# Patient Record
Sex: MALE | Race: WHITE | NOT HISPANIC OR LATINO | URBAN - METROPOLITAN AREA
[De-identification: names, ages, dates, MRNs, and addresses within clinical notes are randomized per-mention and may not be internally consistent; named-entity substitution may affect disease eponyms.]

---

## 2023-10-14 ENCOUNTER — INPATIENT (INPATIENT)
Facility: HOSPITAL | Age: 23
LOS: 0 days | Discharge: ANOTHER HOSPITAL | End: 2023-10-15
Attending: STUDENT IN AN ORGANIZED HEALTH CARE EDUCATION/TRAINING PROGRAM | Admitting: STUDENT IN AN ORGANIZED HEALTH CARE EDUCATION/TRAINING PROGRAM
Payer: COMMERCIAL

## 2023-10-14 VITALS
HEART RATE: 83 BPM | DIASTOLIC BLOOD PRESSURE: 82 MMHG | TEMPERATURE: 98 F | RESPIRATION RATE: 18 BRPM | OXYGEN SATURATION: 97 % | HEIGHT: 73 IN | WEIGHT: 220.02 LBS | SYSTOLIC BLOOD PRESSURE: 119 MMHG

## 2023-10-14 LAB
ALBUMIN SERPL ELPH-MCNC: 3.5 G/DL — SIGNIFICANT CHANGE UP (ref 3.3–5)
ALP SERPL-CCNC: 78 U/L — SIGNIFICANT CHANGE UP (ref 40–120)
ALT FLD-CCNC: 48 U/L — SIGNIFICANT CHANGE UP (ref 12–78)
ANION GAP SERPL CALC-SCNC: 7 MMOL/L — SIGNIFICANT CHANGE UP (ref 5–17)
APPEARANCE UR: CLEAR — SIGNIFICANT CHANGE UP
AST SERPL-CCNC: 144 U/L — HIGH (ref 15–37)
BASOPHILS # BLD AUTO: 0.02 K/UL — SIGNIFICANT CHANGE UP (ref 0–0.2)
BASOPHILS NFR BLD AUTO: 0.2 % — SIGNIFICANT CHANGE UP (ref 0–2)
BILIRUB SERPL-MCNC: 0.7 MG/DL — SIGNIFICANT CHANGE UP (ref 0.2–1.2)
BILIRUB UR-MCNC: NEGATIVE — SIGNIFICANT CHANGE UP
BUN SERPL-MCNC: 11 MG/DL — SIGNIFICANT CHANGE UP (ref 7–23)
CALCIUM SERPL-MCNC: 8.8 MG/DL — SIGNIFICANT CHANGE UP (ref 8.5–10.1)
CHLORIDE SERPL-SCNC: 106 MMOL/L — SIGNIFICANT CHANGE UP (ref 96–108)
CO2 SERPL-SCNC: 25 MMOL/L — SIGNIFICANT CHANGE UP (ref 22–31)
COLOR SPEC: YELLOW — SIGNIFICANT CHANGE UP
CREAT SERPL-MCNC: 0.93 MG/DL — SIGNIFICANT CHANGE UP (ref 0.5–1.3)
DIFF PNL FLD: ABNORMAL
EGFR: 117 ML/MIN/1.73M2 — SIGNIFICANT CHANGE UP
EOSINOPHIL # BLD AUTO: 0.1 K/UL — SIGNIFICANT CHANGE UP (ref 0–0.5)
EOSINOPHIL NFR BLD AUTO: 0.9 % — SIGNIFICANT CHANGE UP (ref 0–6)
EPI CELLS # UR: SIGNIFICANT CHANGE UP
GLUCOSE SERPL-MCNC: 94 MG/DL — SIGNIFICANT CHANGE UP (ref 70–99)
GLUCOSE UR QL: NEGATIVE MG/DL — SIGNIFICANT CHANGE UP
HCT VFR BLD CALC: 41.7 % — SIGNIFICANT CHANGE UP (ref 39–50)
HGB BLD-MCNC: 13.9 G/DL — SIGNIFICANT CHANGE UP (ref 13–17)
IMM GRANULOCYTES NFR BLD AUTO: 0.3 % — SIGNIFICANT CHANGE UP (ref 0–0.9)
KETONES UR-MCNC: ABNORMAL
LEUKOCYTE ESTERASE UR-ACNC: NEGATIVE — SIGNIFICANT CHANGE UP
LYMPHOCYTES # BLD AUTO: 1.67 K/UL — SIGNIFICANT CHANGE UP (ref 1–3.3)
LYMPHOCYTES # BLD AUTO: 15.1 % — SIGNIFICANT CHANGE UP (ref 13–44)
MCHC RBC-ENTMCNC: 27.5 PG — SIGNIFICANT CHANGE UP (ref 27–34)
MCHC RBC-ENTMCNC: 33.3 G/DL — SIGNIFICANT CHANGE UP (ref 32–36)
MCV RBC AUTO: 82.6 FL — SIGNIFICANT CHANGE UP (ref 80–100)
MONOCYTES # BLD AUTO: 0.74 K/UL — SIGNIFICANT CHANGE UP (ref 0–0.9)
MONOCYTES NFR BLD AUTO: 6.7 % — SIGNIFICANT CHANGE UP (ref 2–14)
NEUTROPHILS # BLD AUTO: 8.53 K/UL — HIGH (ref 1.8–7.4)
NEUTROPHILS NFR BLD AUTO: 76.8 % — SIGNIFICANT CHANGE UP (ref 43–77)
NITRITE UR-MCNC: NEGATIVE — SIGNIFICANT CHANGE UP
NRBC # BLD: 0 /100 WBCS — SIGNIFICANT CHANGE UP (ref 0–0)
PH UR: 6 — SIGNIFICANT CHANGE UP (ref 5–8)
PLATELET # BLD AUTO: 219 K/UL — SIGNIFICANT CHANGE UP (ref 150–400)
POTASSIUM SERPL-MCNC: 3.8 MMOL/L — SIGNIFICANT CHANGE UP (ref 3.5–5.3)
POTASSIUM SERPL-SCNC: 3.8 MMOL/L — SIGNIFICANT CHANGE UP (ref 3.5–5.3)
PROT SERPL-MCNC: 7.6 GM/DL — SIGNIFICANT CHANGE UP (ref 6–8.3)
PROT UR-MCNC: 30 MG/DL
RBC # BLD: 5.05 M/UL — SIGNIFICANT CHANGE UP (ref 4.2–5.8)
RBC # FLD: 12.5 % — SIGNIFICANT CHANGE UP (ref 10.3–14.5)
RBC CASTS # UR COMP ASSIST: SIGNIFICANT CHANGE UP /HPF (ref 0–4)
SODIUM SERPL-SCNC: 138 MMOL/L — SIGNIFICANT CHANGE UP (ref 135–145)
SP GR SPEC: 1.01 — SIGNIFICANT CHANGE UP (ref 1.01–1.02)
UROBILINOGEN FLD QL: 4 MG/DL
WBC # BLD: 11.09 K/UL — HIGH (ref 3.8–10.5)
WBC # FLD AUTO: 11.09 K/UL — HIGH (ref 3.8–10.5)
WBC UR QL: SIGNIFICANT CHANGE UP

## 2023-10-14 PROCEDURE — 93010 ELECTROCARDIOGRAM REPORT: CPT

## 2023-10-14 PROCEDURE — 99285 EMERGENCY DEPT VISIT HI MDM: CPT

## 2023-10-14 RX ORDER — ACETAMINOPHEN 500 MG
975 TABLET ORAL ONCE
Refills: 0 | Status: COMPLETED | OUTPATIENT
Start: 2023-10-14 | End: 2023-10-14

## 2023-10-14 RX ORDER — FAMOTIDINE 10 MG/ML
20 INJECTION INTRAVENOUS ONCE
Refills: 0 | Status: COMPLETED | OUTPATIENT
Start: 2023-10-14 | End: 2023-10-14

## 2023-10-14 RX ORDER — METOCLOPRAMIDE HCL 10 MG
10 TABLET ORAL ONCE
Refills: 0 | Status: COMPLETED | OUTPATIENT
Start: 2023-10-14 | End: 2023-10-14

## 2023-10-14 RX ORDER — SODIUM CHLORIDE 9 MG/ML
1000 INJECTION INTRAMUSCULAR; INTRAVENOUS; SUBCUTANEOUS ONCE
Refills: 0 | Status: COMPLETED | OUTPATIENT
Start: 2023-10-14 | End: 2023-10-14

## 2023-10-14 RX ADMIN — Medication 10 MILLIGRAM(S): at 23:12

## 2023-10-14 RX ADMIN — SODIUM CHLORIDE 1000 MILLILITER(S): 9 INJECTION INTRAMUSCULAR; INTRAVENOUS; SUBCUTANEOUS at 22:51

## 2023-10-14 RX ADMIN — FAMOTIDINE 20 MILLIGRAM(S): 10 INJECTION INTRAVENOUS at 23:12

## 2023-10-14 RX ADMIN — SODIUM CHLORIDE 1000 MILLILITER(S): 9 INJECTION INTRAMUSCULAR; INTRAVENOUS; SUBCUTANEOUS at 23:12

## 2023-10-14 RX ADMIN — Medication 975 MILLIGRAM(S): at 22:51

## 2023-10-14 NOTE — ED ADULT TRIAGE NOTE - CHIEF COMPLAINT QUOTE
No PMH  C/o fever, cough, chills,  general malaises for 5 days. Was seen at the [facility] for similar symptoms, tested negative for COVID and flu.  Noticed lower lip swelling for last 1 hr. Denies sob, throat closing. Speaks full complete sentences, unlabored breathing on RA. No PMH  C/o fever, cough, chills,  general malaises for 5 days. Was seen at the [facility] for similar symptoms, tested negative for COVID and flu.  Noticed lower lip swelling for last 1 hr. Denies sob, throat closing. Speaks full complete sentences, unlabored breathing on RA. 18G LAC placed by EMS.

## 2023-10-14 NOTE — ED PROVIDER NOTE - CARE PROVIDERS DIRECT ADDRESSES
[email or web address]

## 2023-10-14 NOTE — ED ADULT NURSE NOTE - CHIEF COMPLAINT QUOTE
No PMH  C/o fever, cough, chills,  general malaises for 5 days. Was seen at the [facility] for similar symptoms, tested negative for COVID and flu.  Noticed lower lip swelling for last 1 hr. Denies sob, throat closing. Speaks full complete sentences, unlabored breathing on RA. 18G LAC placed by EMS.

## 2023-10-14 NOTE — ED PROVIDER NOTE - CLINICAL SUMMARY MEDICAL DECISION MAKING FREE TEXT BOX
24 yo M with likely viral syndrome, also concerning for pna, ebs and hiv unlikely, infectious diarrhea, dehydration  -cbc, cmp, ua, cx, epv, hiv, rvp, CXR, ekg, iv, pepcid/reglan for n/v, tylenol, hydration bolus  -f/u results, reeval

## 2023-10-14 NOTE — ED ADULT NURSE REASSESSMENT NOTE - NS ED NURSE REASSESS COMMENT FT1
Report from BRIT Rubio. Patient is AAOx4, sitting comfortably in bed with no complaints at this time. Respirations equal and unlabored. No acute distress noted at this time.

## 2023-10-14 NOTE — ED PROVIDER NOTE - PHYSICAL EXAMINATION
Vitals: WNL  Gen: AAOx3, NAD, sitting comfortably in stretcher  Head: ncat, perrla, eomi b/l  ENT: patent airway without exudate/erytehma, no swelling of airway/posterior oropharynx, + slight L lower lip swelling without rash or lesion, cold sore noted on R lower lip  Neck: supple, no lymphadenopathy, no midline deviation  Heart: rrr, no m/r/g  Lungs: CTA b/l, no rales/ronchi/wheezes  Abd: soft, nontender, non-distended, no rebound or guarding  Ext: no clubbing/cyanosis/edema  Neuro: sensation and muscle strength intact b/l

## 2023-10-14 NOTE — ED PROVIDER NOTE - OBJECTIVE STATEMENT
22 yo M with 5 days of viral symptoms changing over time.  Pt. explains it started with fever, chills, malaise, progressed to sore throat, then progressed to intractable N/V/D over last two days, unable to tolerate PO intake.  Pt. went to [facility] last night, had CT scan and labs--flu/covid negative, slightly elevated WBC's, grossly negative CT scan (slight liver enlargement).  Pt. admits to cramps in R forearm, which he thinks are likely related to dehydration.  Lastly pt. noticed lower L lip swelling and some swelling of hands b/l, which is also unusual.  Abd pain is improved since yesterday--and minimal at this time.  No recent travel or suspicious food intake.  Pt. says these symptoms are not usual for him.  ROS: negative for cough, headache, chest pain, shortness of breath, rash, paresthesia, and weakness--all other systems reviewed are negative.   PMH: negative; Meds: Denies; SH: Denies smoking/drinking/drug use

## 2023-10-14 NOTE — ED ADULT NURSE NOTE - CINV DISCH TEACH PARTICIP
Detail Level: Zone
Plan: Patient will sign VASQUEZ to request most recent labs from [facility]\\nTreatment will depend on lab results
negative...
Patient

## 2023-10-14 NOTE — ED ADULT NURSE NOTE - NSFALLUNIVINTERV_ED_ALL_ED
Bed/Stretcher in lowest position, wheels locked, appropriate side rails in place/Call bell, personal items and telephone in reach/Instruct patient to call for assistance before getting out of bed/chair/stretcher/Non-slip footwear applied when patient is off stretcher/[place] to call system/Physically safe environment - no spills, clutter or unnecessary equipment/Purposeful proactive rounding/Room/bathroom lighting operational, light cord in reach

## 2023-10-14 NOTE — ED PROVIDER NOTE - CARE PLAN
Principal Discharge DX:	Viral syndrome   1 Principal Discharge DX:	Viral syndrome  Secondary Diagnosis:	Elevated troponin  Secondary Diagnosis:	Acute myocarditis

## 2023-10-14 NOTE — ED PROVIDER NOTE - CARE PROVIDER_API CALL
Dakota Mauricio  Internal Medicine  [address] [place] [id number]  Phone: [phone]  Fax: [phone]  Follow Up Time: Urgent

## 2023-10-14 NOTE — ED PROVIDER NOTE - PROGRESS NOTE DETAILS
Results reported to patient--grossly benign, labs unremarkable thus far resulted   Pt. reports feeling better after meds, ready to go home   pt. agrees to f/u with primary care outpt. asap  pt. understands to return to ED if symptoms worsen; will d/c with meds for symptoms callback received from lab--trop elevated >25,000  pt. denies chest pain/sob over the phone, states he feels well and will come back to ER now  pt. called back and asks to come back to ER immediately regarding results, he understands the importance of coming back to ER pt. returned back to ER, comfortable, stable, will place on monitor, give ASA, admit to tele

## 2023-10-14 NOTE — ED ADULT NURSE NOTE - OBJECTIVE STATEMENT
Pt is A&OX4, ambulatory. States having flu like symptoms since Monday. Complaining of cough, chills, general malaises, abdominal pain, diarrhea, and fever. States being seen at [facility] yesterday for similar symptoms. States having lip swelling and hand swelling since 9:30pm. Denies SOB and throat closing. No acute distress noted. no pmh

## 2023-10-15 ENCOUNTER — INPATIENT (INPATIENT)
Facility: HOSPITAL | Age: 23
LOS: 1 days | Discharge: HOME | DRG: 315 | End: 2023-10-17
Attending: INTERNAL MEDICINE | Admitting: INTERNAL MEDICINE
Payer: COMMERCIAL

## 2023-10-15 VITALS
DIASTOLIC BLOOD PRESSURE: 69 MMHG | RESPIRATION RATE: 17 BRPM | OXYGEN SATURATION: 95 % | HEART RATE: 88 BPM | WEIGHT: 220.02 LBS | HEIGHT: 72 IN | SYSTOLIC BLOOD PRESSURE: 111 MMHG | TEMPERATURE: 98 F

## 2023-10-15 VITALS
SYSTOLIC BLOOD PRESSURE: 123 MMHG | HEART RATE: 68 BPM | DIASTOLIC BLOOD PRESSURE: 81 MMHG | RESPIRATION RATE: 18 BRPM | OXYGEN SATURATION: 97 % | TEMPERATURE: 98 F

## 2023-10-15 DIAGNOSIS — R79.89 OTHER SPECIFIED ABNORMAL FINDINGS OF BLOOD CHEMISTRY: ICD-10-CM

## 2023-10-15 DIAGNOSIS — I51.4 MYOCARDITIS, UNSPECIFIED: ICD-10-CM

## 2023-10-15 DIAGNOSIS — B34.9 VIRAL INFECTION, UNSPECIFIED: ICD-10-CM

## 2023-10-15 LAB
A1C WITH ESTIMATED AVERAGE GLUCOSE RESULT: 5.2 % — SIGNIFICANT CHANGE UP (ref 4–5.6)
ALBUMIN SERPL ELPH-MCNC: 3.6 G/DL — SIGNIFICANT CHANGE UP (ref 3.3–5)
ALBUMIN SERPL ELPH-MCNC: 4.6 G/DL — SIGNIFICANT CHANGE UP (ref 3.3–5)
ALP SERPL-CCNC: 75 U/L — SIGNIFICANT CHANGE UP (ref 40–120)
ALP SERPL-CCNC: 82 U/L — SIGNIFICANT CHANGE UP (ref 40–120)
ALT FLD-CCNC: 35 U/L — SIGNIFICANT CHANGE UP (ref 10–45)
ALT FLD-CCNC: 46 U/L — SIGNIFICANT CHANGE UP (ref 12–78)
AMPHET UR-MCNC: NEGATIVE — SIGNIFICANT CHANGE UP
ANION GAP SERPL CALC-SCNC: 12 MMOL/L — SIGNIFICANT CHANGE UP (ref 5–17)
ANION GAP SERPL CALC-SCNC: 7 MMOL/L — SIGNIFICANT CHANGE UP (ref 5–17)
APTT BLD: 27.6 SEC — SIGNIFICANT CHANGE UP (ref 24.5–35.6)
APTT BLD: 27.7 SEC — SIGNIFICANT CHANGE UP (ref 24.5–35.6)
AST SERPL-CCNC: 69 U/L — HIGH (ref 10–40)
AST SERPL-CCNC: 96 U/L — HIGH (ref 15–37)
BARBITURATES UR SCN-MCNC: NEGATIVE — SIGNIFICANT CHANGE UP
BASE EXCESS BLDV CALC-SCNC: 3.2 MMOL/L — HIGH (ref -2–3)
BASOPHILS # BLD AUTO: 0.03 K/UL — SIGNIFICANT CHANGE UP (ref 0–0.2)
BASOPHILS NFR BLD AUTO: 0.5 % — SIGNIFICANT CHANGE UP (ref 0–2)
BENZODIAZ UR-MCNC: NEGATIVE — SIGNIFICANT CHANGE UP
BILIRUB SERPL-MCNC: 0.4 MG/DL — SIGNIFICANT CHANGE UP (ref 0.2–1.2)
BILIRUB SERPL-MCNC: 0.5 MG/DL — SIGNIFICANT CHANGE UP (ref 0.2–1.2)
BLD GP AB SCN SERPL QL: NEGATIVE — SIGNIFICANT CHANGE UP
BUN SERPL-MCNC: 11 MG/DL — SIGNIFICANT CHANGE UP (ref 7–23)
BUN SERPL-MCNC: 9 MG/DL — SIGNIFICANT CHANGE UP (ref 7–23)
CA-I SERPL-SCNC: 1.22 MMOL/L — SIGNIFICANT CHANGE UP (ref 1.15–1.33)
CALCIUM SERPL-MCNC: 8.6 MG/DL — SIGNIFICANT CHANGE UP (ref 8.5–10.1)
CALCIUM SERPL-MCNC: 9.9 MG/DL — SIGNIFICANT CHANGE UP (ref 8.4–10.5)
CHLORIDE BLDV-SCNC: 105 MMOL/L — SIGNIFICANT CHANGE UP (ref 96–108)
CHLORIDE SERPL-SCNC: 103 MMOL/L — SIGNIFICANT CHANGE UP (ref 96–108)
CHLORIDE SERPL-SCNC: 109 MMOL/L — HIGH (ref 96–108)
CHOLEST SERPL-MCNC: 178 MG/DL — SIGNIFICANT CHANGE UP
CK MB BLD-MCNC: 7.2 % — HIGH (ref 0–3.5)
CK MB CFR SERPL CALC: 39.8 NG/ML — HIGH (ref 0.5–3.6)
CK SERPL-CCNC: 550 U/L — HIGH (ref 26–308)
CO2 BLDV-SCNC: 30 MMOL/L — HIGH (ref 22–26)
CO2 SERPL-SCNC: 24 MMOL/L — SIGNIFICANT CHANGE UP (ref 22–31)
CO2 SERPL-SCNC: 25 MMOL/L — SIGNIFICANT CHANGE UP (ref 22–31)
COCAINE METAB.OTHER UR-MCNC: NEGATIVE — SIGNIFICANT CHANGE UP
CREAT SERPL-MCNC: 0.88 MG/DL — SIGNIFICANT CHANGE UP (ref 0.5–1.3)
CREAT SERPL-MCNC: 0.9 MG/DL — SIGNIFICANT CHANGE UP (ref 0.5–1.3)
CRP SERPL-MCNC: 101 MG/L — HIGH (ref 0–4)
CRP SERPL-MCNC: 93 MG/L — HIGH
D DIMER BLD IA.RAPID-MCNC: 6695 NG/ML DDU — HIGH
EGFR: 122 ML/MIN/1.73M2 — SIGNIFICANT CHANGE UP
EGFR: 93 ML/MIN/1.73M2 — SIGNIFICANT CHANGE UP
EOSINOPHIL # BLD AUTO: 0.12 K/UL — SIGNIFICANT CHANGE UP (ref 0–0.5)
EOSINOPHIL NFR BLD AUTO: 2 % — SIGNIFICANT CHANGE UP (ref 0–6)
ERYTHROCYTE [SEDIMENTATION RATE] IN BLOOD: 58 MM/HR — HIGH (ref 0–15)
ESTIMATED AVERAGE GLUCOSE: 103 MG/DL — SIGNIFICANT CHANGE UP (ref 68–114)
GAS PNL BLDV: 138 MMOL/L — SIGNIFICANT CHANGE UP (ref 136–145)
GAS PNL BLDV: SIGNIFICANT CHANGE UP
GLUCOSE BLDV-MCNC: 107 MG/DL — HIGH (ref 70–99)
GLUCOSE SERPL-MCNC: 101 MG/DL — HIGH (ref 70–99)
GLUCOSE SERPL-MCNC: 83 MG/DL — SIGNIFICANT CHANGE UP (ref 70–99)
HCO3 BLDV-SCNC: 28 MMOL/L — SIGNIFICANT CHANGE UP (ref 22–29)
HCT VFR BLD CALC: 40.6 % — SIGNIFICANT CHANGE UP (ref 39–50)
HCT VFR BLD CALC: 41.1 % — SIGNIFICANT CHANGE UP (ref 39–50)
HCT VFR BLDA CALC: 43 % — SIGNIFICANT CHANGE UP (ref 39–51)
HDLC SERPL-MCNC: 15 MG/DL — LOW
HGB BLD CALC-MCNC: 14.2 G/DL — SIGNIFICANT CHANGE UP (ref 12.6–17.4)
HGB BLD-MCNC: 13.4 G/DL — SIGNIFICANT CHANGE UP (ref 13–17)
HGB BLD-MCNC: 13.8 G/DL — SIGNIFICANT CHANGE UP (ref 13–17)
HIV 1+2 AB+HIV1 P24 AG SERPL QL IA: SIGNIFICANT CHANGE UP
IMM GRANULOCYTES NFR BLD AUTO: 0.3 % — SIGNIFICANT CHANGE UP (ref 0–0.9)
INR BLD: 1.13 RATIO — SIGNIFICANT CHANGE UP (ref 0.85–1.18)
INR BLD: 1.22 RATIO — HIGH (ref 0.85–1.18)
LACTATE BLDV-MCNC: 0.9 MMOL/L — SIGNIFICANT CHANGE UP (ref 0.5–2)
LIPID PNL WITH DIRECT LDL SERPL: 120 MG/DL — HIGH
LYMPHOCYTES # BLD AUTO: 1.53 K/UL — SIGNIFICANT CHANGE UP (ref 1–3.3)
LYMPHOCYTES # BLD AUTO: 25.2 % — SIGNIFICANT CHANGE UP (ref 13–44)
MCHC RBC-ENTMCNC: 27.5 PG — SIGNIFICANT CHANGE UP (ref 27–34)
MCHC RBC-ENTMCNC: 28 PG — SIGNIFICANT CHANGE UP (ref 27–34)
MCHC RBC-ENTMCNC: 33 G/DL — SIGNIFICANT CHANGE UP (ref 32–36)
MCHC RBC-ENTMCNC: 33.6 GM/DL — SIGNIFICANT CHANGE UP (ref 32–36)
MCV RBC AUTO: 83.4 FL — SIGNIFICANT CHANGE UP (ref 80–100)
MCV RBC AUTO: 83.5 FL — SIGNIFICANT CHANGE UP (ref 80–100)
METHADONE UR-MCNC: NEGATIVE — SIGNIFICANT CHANGE UP
MONOCYTES # BLD AUTO: 0.38 K/UL — SIGNIFICANT CHANGE UP (ref 0–0.9)
MONOCYTES NFR BLD AUTO: 6.3 % — SIGNIFICANT CHANGE UP (ref 2–14)
NEUTROPHILS # BLD AUTO: 4 K/UL — SIGNIFICANT CHANGE UP (ref 1.8–7.4)
NEUTROPHILS NFR BLD AUTO: 65.7 % — SIGNIFICANT CHANGE UP (ref 43–77)
NON HDL CHOLESTEROL: 162 MG/DL — HIGH
NRBC # BLD: 0 /100 WBCS — SIGNIFICANT CHANGE UP (ref 0–0)
NT-PROBNP SERPL-SCNC: 1022 PG/ML — HIGH (ref 0–300)
OPIATES UR-MCNC: NEGATIVE — SIGNIFICANT CHANGE UP
PCO2 BLDV: 45 MMHG — SIGNIFICANT CHANGE UP (ref 42–55)
PCP SPEC-MCNC: SIGNIFICANT CHANGE UP
PCP UR-MCNC: NEGATIVE — SIGNIFICANT CHANGE UP
PH BLDV: 7.41 — SIGNIFICANT CHANGE UP (ref 7.32–7.43)
PLATELET # BLD AUTO: 217 K/UL — SIGNIFICANT CHANGE UP (ref 150–400)
PLATELET # BLD AUTO: 258 K/UL — SIGNIFICANT CHANGE UP (ref 150–400)
PO2 BLDV: 31 MMHG — SIGNIFICANT CHANGE UP (ref 25–45)
POTASSIUM BLDV-SCNC: 4.5 MMOL/L — SIGNIFICANT CHANGE UP (ref 3.5–5.1)
POTASSIUM SERPL-MCNC: 3.8 MMOL/L — SIGNIFICANT CHANGE UP (ref 3.5–5.3)
POTASSIUM SERPL-MCNC: 3.9 MMOL/L — SIGNIFICANT CHANGE UP (ref 3.5–5.3)
POTASSIUM SERPL-SCNC: 3.8 MMOL/L — SIGNIFICANT CHANGE UP (ref 3.5–5.3)
POTASSIUM SERPL-SCNC: 3.9 MMOL/L — SIGNIFICANT CHANGE UP (ref 3.5–5.3)
PROT SERPL-MCNC: 7.7 GM/DL — SIGNIFICANT CHANGE UP (ref 6–8.3)
PROT SERPL-MCNC: 8.1 G/DL — SIGNIFICANT CHANGE UP (ref 6–8.3)
PROTHROM AB SERPL-ACNC: 13.3 SEC — HIGH (ref 9.5–13)
PROTHROM AB SERPL-ACNC: 13.5 SEC — HIGH (ref 9.5–13)
RAPID RVP RESULT: SIGNIFICANT CHANGE UP
RBC # BLD: 4.87 M/UL — SIGNIFICANT CHANGE UP (ref 4.2–5.8)
RBC # BLD: 4.92 M/UL — SIGNIFICANT CHANGE UP (ref 4.2–5.8)
RBC # FLD: 12.4 % — SIGNIFICANT CHANGE UP (ref 10.3–14.5)
RBC # FLD: 12.5 % — SIGNIFICANT CHANGE UP (ref 10.3–14.5)
RH IG SCN BLD-IMP: POSITIVE — SIGNIFICANT CHANGE UP
SAO2 % BLDV: 46.7 % — LOW (ref 67–88)
SARS-COV-2 RNA SPEC QL NAA+PROBE: SIGNIFICANT CHANGE UP
SODIUM SERPL-SCNC: 140 MMOL/L — SIGNIFICANT CHANGE UP (ref 135–145)
THC UR QL: NEGATIVE — SIGNIFICANT CHANGE UP
TRIGL SERPL-MCNC: 237 MG/DL — HIGH
TROPONIN I, HIGH SENSITIVITY RESULT: SIGNIFICANT CHANGE UP NG/L
TROPONIN T, HIGH SENSITIVITY RESULT: 2716 NG/L — HIGH (ref 0–51)
WBC # BLD: 6.07 K/UL — SIGNIFICANT CHANGE UP (ref 3.8–10.5)
WBC # BLD: 6.08 K/UL — SIGNIFICANT CHANGE UP (ref 3.8–10.5)
WBC # FLD AUTO: 6.07 K/UL — SIGNIFICANT CHANGE UP (ref 3.8–10.5)
WBC # FLD AUTO: 6.08 K/UL — SIGNIFICANT CHANGE UP (ref 3.8–10.5)

## 2023-10-15 PROCEDURE — 93010 ELECTROCARDIOGRAM REPORT: CPT

## 2023-10-15 PROCEDURE — 71045 X-RAY EXAM CHEST 1 VIEW: CPT | Mod: 26

## 2023-10-15 PROCEDURE — 99285 EMERGENCY DEPT VISIT HI MDM: CPT

## 2023-10-15 PROCEDURE — 93306 TTE W/DOPPLER COMPLETE: CPT | Mod: 26

## 2023-10-15 PROCEDURE — 99223 1ST HOSP IP/OBS HIGH 75: CPT

## 2023-10-15 PROCEDURE — 99223 1ST HOSP IP/OBS HIGH 75: CPT | Mod: 25

## 2023-10-15 RX ORDER — FAMOTIDINE 10 MG/ML
1 INJECTION INTRAVENOUS
Qty: 7 | Refills: 0
Start: 2023-10-15 | End: 2023-10-21

## 2023-10-15 RX ORDER — ASPIRIN/CALCIUM CARB/MAGNESIUM 324 MG
81 TABLET ORAL DAILY
Refills: 0 | Status: DISCONTINUED | OUTPATIENT
Start: 2023-10-15 | End: 2023-10-15

## 2023-10-15 RX ORDER — HEPARIN SODIUM 5000 [USP'U]/ML
4000 INJECTION INTRAVENOUS; SUBCUTANEOUS EVERY 6 HOURS
Refills: 0 | Status: DISCONTINUED | OUTPATIENT
Start: 2023-10-15 | End: 2023-10-15

## 2023-10-15 RX ORDER — METOCLOPRAMIDE HCL 10 MG
1 TABLET ORAL
Qty: 20 | Refills: 0
Start: 2023-10-15 | End: 2023-10-19

## 2023-10-15 RX ORDER — HEPARIN SODIUM 5000 [USP'U]/ML
8000 INJECTION INTRAVENOUS; SUBCUTANEOUS ONCE
Refills: 0 | Status: DISCONTINUED | OUTPATIENT
Start: 2023-10-15 | End: 2023-10-15

## 2023-10-15 RX ORDER — HEPARIN SODIUM 5000 [USP'U]/ML
INJECTION INTRAVENOUS; SUBCUTANEOUS
Qty: 25000 | Refills: 0 | Status: DISCONTINUED | OUTPATIENT
Start: 2023-10-15 | End: 2023-10-15

## 2023-10-15 RX ORDER — INFLUENZA VIRUS VACCINE 15; 15; 15; 15 UG/.5ML; UG/.5ML; UG/.5ML; UG/.5ML
0.5 SUSPENSION INTRAMUSCULAR ONCE
Refills: 0 | Status: DISCONTINUED | OUTPATIENT
Start: 2023-10-15 | End: 2023-10-15

## 2023-10-15 RX ORDER — LOPERAMIDE HCL 2 MG
1 TABLET ORAL
Qty: 20 | Refills: 0
Start: 2023-10-15 | End: 2023-10-19

## 2023-10-15 RX ORDER — LANOLIN ALCOHOL/MO/W.PET/CERES
3 CREAM (GRAM) TOPICAL AT BEDTIME
Refills: 0 | Status: DISCONTINUED | OUTPATIENT
Start: 2023-10-15 | End: 2023-10-15

## 2023-10-15 RX ORDER — HEPARIN SODIUM 5000 [USP'U]/ML
8000 INJECTION INTRAVENOUS; SUBCUTANEOUS EVERY 6 HOURS
Refills: 0 | Status: DISCONTINUED | OUTPATIENT
Start: 2023-10-15 | End: 2023-10-15

## 2023-10-15 RX ORDER — ACETAMINOPHEN 500 MG
650 TABLET ORAL EVERY 6 HOURS
Refills: 0 | Status: DISCONTINUED | OUTPATIENT
Start: 2023-10-15 | End: 2023-10-15

## 2023-10-15 RX ORDER — ONDANSETRON 8 MG/1
4 TABLET, FILM COATED ORAL EVERY 8 HOURS
Refills: 0 | Status: DISCONTINUED | OUTPATIENT
Start: 2023-10-15 | End: 2023-10-15

## 2023-10-15 RX ADMIN — Medication 81 MILLIGRAM(S): at 15:35

## 2023-10-15 NOTE — ED PROVIDER NOTE - OBJECTIVE STATEMENT
23-year-old male presents as a transfer from [place].  Patient reports that he has been having body aches and fever up to 102 beginning 6 days ago.  She says she is 3 to 4 days ago started having nausea and vomiting and presented to an emergency department where he was given IV hydration and Zofran and subsequently discharged.  He was called back and advised that his troponin was elevated. patient admitted and troponin uptrending, echo with preserved EF. transferred for cardiac MRI.

## 2023-10-15 NOTE — H&P ADULT - HISTORY OF PRESENT ILLNESS
23 year old male with no significant PMH presents to the ED with 5 days history of viral symptoms changing over time.  Endorses  it started with fever, chills, malaise, progressed to sore throat, over the past 2 days he has been having intractable N/V/D with inability  to tolerate PO intake.  Pt. went to [facility] last night, had CT scan and labs--flu/covid negative, slightly elevated WBC's, grossly negative CT scan (slight liver enlargement). Endorses last nigh after he left the hospital he started to experience edema of his lips and hands which prompted ED visit. Denies SOB, chest pain, palpitations, SOB, headache, dizziness. In the ED, labs remarkable for elevated troponin (>25,000). ECG- NSR.  Upon evaluation patient is AAOx4, NAD, endorses he is feeling well, denies any pain or discomfort.

## 2023-10-15 NOTE — ED PROVIDER NOTE - QRS
Diabetes is improving with lifestyle modifications.   Continue current treatment regimen.  Diabetes will be reassessed in 3 months.   96

## 2023-10-15 NOTE — H&P ADULT - NSHPPHYSICALEXAM_GEN_ALL_CORE
Constitutional:  well developed, well nourished, no acute distress .   Eyes:  normal conjunctiva.   Neck:  normal venous pressure, no carotid bruit.   Cardiac:  RRR, no murmur, no rub, no gallop,   Pulmonary:  clear breath sounds in all  fields, good air entry, no respiratory distress.   Abdomen:  +BS,  soft, non-tender, no masses/organomegaly  Musculoskeletal:  normal gait.   Extremities:  no cyanosis, no clubbing, no varicosities, Lower extremity edema 2 b/l.   Skin:  no rash, no skin lesions.   Neurological:  moves all extremities, no focal deficits, normal speech.   Psychiatric:  alert and oriented, normal memory.

## 2023-10-15 NOTE — CHART NOTE - NSCHARTNOTEFT_GEN_A_CORE
Patient is a 23 yom with no medical history admitted to [facility] for elevated troponin to 29K, admitted for further workup.   - EKG is NSR, no ischemia noted, patient currently without any chest pain, shortness of breath, and is hemodynamically stable at this time   - trend troponin q6h   - f/u Utox  - f/u EBV, CMV and blood cultures to r/o infectious causes   - discussed with cardiology - given elevated ESR, CRP and recent viral illness, myocarditis is higher on the differential  - As such patient would need to obtain cardiac MRI, and would need continuing inpatient care  - Discussed with transfer center - patient is being transferred to [facility] for further workup    Enrico Vivas MD  Division of Hospital Medicine  Available via MS Teams

## 2023-10-15 NOTE — H&P ADULT - PROBLEM SELECTOR PLAN 2
5 days history of viral syndrome associated with N/V/D & 1 episode fo edema of the lips & hands which resolved   - Zofran   - Supportive care

## 2023-10-15 NOTE — H&P ADULT - ASSESSMENT
The patient is a 23y Male was transferred to [facility] because of elevated troponin.    Elevated troponin:    Cardio eval  Cardiac MRI

## 2023-10-15 NOTE — H&P ADULT - PROBLEM SELECTOR PLAN 1
R/O Myocarditis 2/2 viral infection   Troponin >25,000  ECG- NSR- No ST changes   Asymptomatic  - Tele  - ASA  - Echo   - Trend troponin   - Cardio consult

## 2023-10-15 NOTE — ED PROVIDER NOTE - ATTENDING APP SHARED VISIT CONTRIBUTION OF CARE
23y m [employer]. Now comes to ed transfer from [facility]/. Pt had c/o flu like symptoms few days ago and was seen at [facility] and [place] home. Pt subsequently tx at [facility]/ last night dc and recalled when trop highly positive. NO cp/sob/palps/abd pain. Pt admitted to hosp and transferred for concerns for myocarditis and Cardiac MRI. PE WDWN male awake alert normocephalic atraumatic neck supple chest clear anterior & posterior cv no rubs, gallops or murmurs abd soft +bs no mass guarding rebound, Neruo gcs 15 speech fluent power 5/5 all extr.  Garrett Patel MD, Facep

## 2023-10-15 NOTE — ED ADULT NURSE NOTE - OBJECTIVE STATEMENT
24 y/o male no significant PMHx arrives to [facility] ED by [facility] from [place] with c/o abnormal lab results. EMS states patient seen at [facility] ED Friday for flu-like symptoms, left before trops resulted, called back to return for elevated trops, seen at [facility] yesterday and had normal echo cardiogram performed however trops 2500 and trending upward, transferred to [facility] for cardiac MRI r/o myocarditis. Patient states "I feel better right now  than I have all weekend". Patient is A&Ox4. Respirations spontaneous and unlabored. Denies SOB, dyspnea, cough, chest pain, palpitations. EKG done, placed on CM. No abdominal pain, soft NT/ND. No n/v since friday. Denies urinary symptoms. Denies fever/chills. No sick contacts. Skin is warm/dry and normal for race. Ambulates independently at baseline.

## 2023-10-15 NOTE — H&P ADULT - ASSESSMENT
23 year old male with no significant PMH presents to the ED with 5 days history of viral symptoms changing over time.  Endorses  it started with fever, chills, malaise, progressed to sore throat, over the past 2 days he has been having intractable N/V/D with inability  to tolerate PO intake.  Pt. went to [facility] last night, had CT scan and labs--flu/covid negative, slightly elevated WBC's, grossly negative CT scan (slight liver enlargement). Endorses last nigh after he left the hospital he started to experience edema of his lips and hands which prompted ED visit. Denies SOB, chest pain, palpitations, SOB, headache, dizziness. In the ED, labs remarkable for elevated troponin (>25,000). ECG- NSR.  Upon evaluation patient is AAOx4, NAD, endorses he is feeling well, denies any pain or discomfort.

## 2023-10-15 NOTE — ED ADULT NURSE NOTE - NSFALLUNIVINTERV_ED_ALL_ED
Bed/Stretcher in lowest position, wheels locked, appropriate side rails in place/Call bell, personal items and telephone in reach/Instruct patient to call for assistance before getting out of bed/chair/stretcher/Non-slip footwear applied when patient is off stretcher/[place] to call system/Physically safe environment - no spills, clutter or unnecessary equipment/Purposeful proactive rounding/Room/bathroom lighting operational, light cord in reach

## 2023-10-15 NOTE — H&P ADULT - HISTORY OF PRESENT ILLNESS
23-year-old male presents as a transfer from [place].  Patient reports that he has been having body aches and fever up to 102 beginning 6 days ago.  She says she is 3 to 4 days ago started having nausea and vomiting and presented to an emergency department where he was given IV hydration and Zofran and subsequently discharged.  He was called back and advised that his troponin was elevated. patient admitted and troponin uptrending, echo with preserved EF. transferred for cardiac MRI.   23-year-old male presents as a transfer from [place].  Patient reports that he has been having body aches and fever up to 102 beginning 6 days ago.  She says she is 3 to 4 days ago started having nausea and vomiting and presented to an emergency department where he was given IV hydration and Zofran and subsequently discharged.  He was called back and advised that his troponin was elevated. patient admitted and troponin up trending, echo with preserved EF. transferred for cardiac MRI.

## 2023-10-15 NOTE — H&P ADULT - NSHPLABSRESULTS_GEN_ALL_CORE
13.9   11.09 )-----------( 219      ( 14 Oct 2023 22:44 )             41.7     10-14    138  |  106  |  11  ----------------------------<  94  3.8   |  25  |  0.93    Ca    8.8      14 Oct 2023 22:44    TPro  7.6  /  Alb  3.5  /  TBili  0.7  /  DBili  x   /  AST  144<H>  /  ALT  48  /  AlkPhos  78  10-14      Urinalysis Basic - ( 14 Oct 2023 23:30 )    Color: Yellow / Appearance: Clear / S.015 / pH: x  Gluc: x / Ketone: Small  / Bili: Negative / Urobili: 4 mg/dL   Blood: x / Protein: 30 mg/dL / Nitrite: Negative   Leuk Esterase: Negative / RBC: 0-2 /HPF / WBC 0-2   Sq Epi: x / Non Sq Epi: x / Bacteria: x

## 2023-10-15 NOTE — CONSULT NOTE ADULT - SUBJECTIVE AND OBJECTIVE BOX
TALI CASTELAN  [id number]    Date of Consult:  10/15/23  CHIEF COMPLAINT:  malaise  HISTORY OF PRESENT ILLNESS:  23M with no pmhx presents with malaise pt states recent URI, with malaise for the past week, went to [facility] with dehydration 2 nights ago, given IVF and Zofran and felt better and was d/c from ER. now comes in with recurrent fatigue, malaise, foudn to have trop >47307, CK >500 with elevated CKMB, elevated ESR, CRP  pt is comfortable appearing, grossly euvolemic, ekg showing SR, no events on tele.     Allergies    No Known Allergies    Intolerances    	    MEDICATIONS:  aspirin  chewable 81 milliGRAM(s) Oral daily        acetaminophen     Tablet .. 650 milliGRAM(s) Oral every 6 hours PRN  melatonin 3 milliGRAM(s) Oral at bedtime PRN  ondansetron Injectable 4 milliGRAM(s) IV Push every 8 hours PRN    aluminum hydroxide/magnesium hydroxide/simethicone Suspension 30 milliLiter(s) Oral every 4 hours PRN      influenza   Vaccine 0.5 milliLiter(s) IntraMuscular once      PAST MEDICAL & SURGICAL HISTORY:  No pertinent past medical history    no hx of myocarditis       FAMILY HISTORY:  no cardiac hx    SOCIAL HISTORY:    denies drug use, tob, etoh    REVIEW OF SYSTEMS:  See HPI. Otherwise, 10 point ROS done and otherwise negative.    PHYSICAL EXAM:  T(C): 36.6 (10-15-23 @ 11:09), Max: 37.1 (10-14-23 @ 23:15)  HR: 72 (10-15-23 @ 11:09) (68 - 83)  BP: 116/81 (10-15-23 @ 11:09) (109/75 - 135/69)  RR: 18 (10-15-23 @ 11:09) (13 - 18)  SpO2: 100% (10-15-23 @ 11:09) (97% - 100%)  Wt(kg): --  I&O's Summary      Appearance: Normal	  HEENT:   Normal oral mucosa, PERRL, EOMI	  Lymphatic: No lymphadenopathy  Cardiovascular: Normal S1 S2, No JVD, No murmurs, No edema  Respiratory: Lungs clear to auscultation	  Psychiatry: A & O x 3, Mood & affect appropriate  Gastrointestinal:  Soft, Non-tender, + BS	  Skin: No rashes, No ecchymoses, No cyanosis	  Neurologic: Non-focal  Extremities: Normal range of motion, No clubbing, cyanosis       LABS:	 	    CBC Full  -  ( 15 Oct 2023 10:55 )  WBC Count : 6.07 K/uL  Hemoglobin : 13.4 g/dL  Hematocrit : 40.6 %  Platelet Count - Automated : 217 K/uL  Mean Cell Volume : 83.4 fl  Mean Cell Hemoglobin : 27.5 pg  Mean Cell Hemoglobin Concentration : 33.0 g/dL  Auto Neutrophil # : x  Auto Lymphocyte # : x  Auto Monocyte # : x  Auto Eosinophil # : x  Auto Basophil # : x  Auto Neutrophil % : x  Auto Lymphocyte % : x  Auto Monocyte % : x  Auto Eosinophil % : x  Auto Basophil % : x    10-15    140  |  109<H>  |  11  ----------------------------<  83  3.8   |  24  |  0.90  10-14    138  |  106  |  11  ----------------------------<  94  3.8   |  25  |  0.93    Ca    8.6      15 Oct 2023 10:55  Ca    8.8      14 Oct 2023 22:44    TPro  7.7  /  Alb  3.6  /  TBili  0.5  /  DBili  x   /  AST  96<H>  /  ALT  46  /  AlkPhos  75  10-15  TPro  7.6  /  Alb  3.5  /  TBili  0.7  /  DBili  x   /  AST  144<H>  /  ALT  48  /  AlkPhos  78  10-14      proBNP:   Lipid Profile:   HgA1c:   TSH:       CARDIAC MARKERS:            TELEMETRY: 	    ECG:  	  RADIOLOGY:  OTHER: 	    PREVIOUS DIAGNOSTIC TESTING:    [ ] Echocardiogram:  [ ]  Catheterization:  [ ] Stress Test:  	  	  ASSESSMENT/PLAN: 	    Garrett Rodriguez MD

## 2023-10-15 NOTE — CONSULT NOTE ADULT - ASSESSMENT
23M with no pmhx presents with malaise in setting of recent URI found to have elevated CE.     given profound elevation in CE, ESR/CRP, myocarditis a concern  currently pt is asymptomatic and euvolemic, no cp or sob  ekg showing SR  would cont to monitor on tele for arrhythmias  monitor hemodynamics  d/w pt that if this is indeed myocarditis, there is a risk of decompensation  would not discharge pt.  would keep inpt to monitor  tte showing preserved Lv systolic function, would check cardiac MRI   cont to trend trop, ck, ckmb, pro-bnp, ESR CRP  pt adamantly denies drug use, would confirm with tox screen  will follow with you

## 2023-10-15 NOTE — ED PROVIDER NOTE - CLINICAL SUMMARY MEDICAL DECISION MAKING FREE TEXT BOX
Adult male pw c/o markedly elevated trop in setting of recent viral syndrome. Concerns for myocarditis. Plan tba medicine for MRI   Garrett Patel MD, Facep

## 2023-10-16 LAB
ANION GAP SERPL CALC-SCNC: 15 MMOL/L — SIGNIFICANT CHANGE UP (ref 5–17)
BUN SERPL-MCNC: 9 MG/DL — SIGNIFICANT CHANGE UP (ref 7–23)
CALCIUM SERPL-MCNC: 9.5 MG/DL — SIGNIFICANT CHANGE UP (ref 8.4–10.5)
CHLORIDE SERPL-SCNC: 103 MMOL/L — SIGNIFICANT CHANGE UP (ref 96–108)
CK MB BLD-MCNC: 4.4 % — HIGH (ref 0–3.5)
CK MB BLD-MCNC: 5 % — HIGH (ref 0–3.5)
CK MB CFR SERPL CALC: 10.9 NG/ML — HIGH (ref 0–6.7)
CK MB CFR SERPL CALC: 6.7 NG/ML — SIGNIFICANT CHANGE UP (ref 0–6.7)
CK SERPL-CCNC: 153 U/L — SIGNIFICANT CHANGE UP (ref 30–200)
CK SERPL-CCNC: 218 U/L — HIGH (ref 30–200)
CMV DNA CSF QL NAA+PROBE: SIGNIFICANT CHANGE UP IU/ML
CMV DNA SPEC NAA+PROBE-LOG#: SIGNIFICANT CHANGE UP LOG10IU/ML
CO2 SERPL-SCNC: 22 MMOL/L — SIGNIFICANT CHANGE UP (ref 22–31)
CREAT SERPL-MCNC: 1.07 MG/DL — SIGNIFICANT CHANGE UP (ref 0.5–1.3)
CULTURE RESULTS: SIGNIFICANT CHANGE UP
EBV EA AB SER IA-ACNC: <5 U/ML — SIGNIFICANT CHANGE UP
EBV EA AB SER IA-ACNC: <5 U/ML — SIGNIFICANT CHANGE UP
EBV EA AB TITR SER IF: POSITIVE
EBV EA AB TITR SER IF: POSITIVE
EBV EA IGG SER-ACNC: NEGATIVE — SIGNIFICANT CHANGE UP
EBV EA IGG SER-ACNC: NEGATIVE — SIGNIFICANT CHANGE UP
EBV NA IGG SER IA-ACNC: 119 U/ML — HIGH
EBV NA IGG SER IA-ACNC: 121 U/ML — HIGH
EBV PATRN SPEC IB-IMP: SIGNIFICANT CHANGE UP
EBV PATRN SPEC IB-IMP: SIGNIFICANT CHANGE UP
EBV VCA IGG AVIDITY SER QL IA: POSITIVE
EBV VCA IGG AVIDITY SER QL IA: POSITIVE
EBV VCA IGM SER IA-ACNC: 306 U/ML — HIGH
EBV VCA IGM SER IA-ACNC: 311 U/ML — HIGH
EBV VCA IGM SER IA-ACNC: 39.1 U/ML — HIGH
EBV VCA IGM SER IA-ACNC: 39.3 U/ML — HIGH
EBV VCA IGM TITR FLD: ABNORMAL
EBV VCA IGM TITR FLD: ABNORMAL
EGFR: 99 ML/MIN/1.73M2 — SIGNIFICANT CHANGE UP
ERYTHROCYTE [SEDIMENTATION RATE] IN BLOOD: 20 MM/HR — HIGH (ref 0–15)
GLUCOSE SERPL-MCNC: 88 MG/DL — SIGNIFICANT CHANGE UP (ref 70–99)
HCT VFR BLD CALC: 39.3 % — SIGNIFICANT CHANGE UP (ref 39–50)
HGB BLD-MCNC: 13.1 G/DL — SIGNIFICANT CHANGE UP (ref 13–17)
MCHC RBC-ENTMCNC: 28.1 PG — SIGNIFICANT CHANGE UP (ref 27–34)
MCHC RBC-ENTMCNC: 33.3 GM/DL — SIGNIFICANT CHANGE UP (ref 32–36)
MCV RBC AUTO: 84.3 FL — SIGNIFICANT CHANGE UP (ref 80–100)
NRBC # BLD: 0 /100 WBCS — SIGNIFICANT CHANGE UP (ref 0–0)
PLATELET # BLD AUTO: 248 K/UL — SIGNIFICANT CHANGE UP (ref 150–400)
POTASSIUM SERPL-MCNC: 3.9 MMOL/L — SIGNIFICANT CHANGE UP (ref 3.5–5.3)
POTASSIUM SERPL-SCNC: 3.9 MMOL/L — SIGNIFICANT CHANGE UP (ref 3.5–5.3)
RBC # BLD: 4.66 M/UL — SIGNIFICANT CHANGE UP (ref 4.2–5.8)
RBC # FLD: 12.5 % — SIGNIFICANT CHANGE UP (ref 10.3–14.5)
SODIUM SERPL-SCNC: 140 MMOL/L — SIGNIFICANT CHANGE UP (ref 135–145)
SPECIMEN SOURCE: SIGNIFICANT CHANGE UP
TROPONIN T, HIGH SENSITIVITY RESULT: 1323 NG/L — HIGH (ref 0–51)
TROPONIN T, HIGH SENSITIVITY RESULT: 1323 NG/L — HIGH (ref 0–51)
TROPONIN T, HIGH SENSITIVITY RESULT: 2203 NG/L — HIGH (ref 0–51)
TROPONIN T, HIGH SENSITIVITY RESULT: 2974 NG/L — HIGH (ref 0–51)
WBC # BLD: 6.52 K/UL — SIGNIFICANT CHANGE UP (ref 3.8–10.5)
WBC # FLD AUTO: 6.52 K/UL — SIGNIFICANT CHANGE UP (ref 3.8–10.5)

## 2023-10-16 PROCEDURE — 99223 1ST HOSP IP/OBS HIGH 75: CPT

## 2023-10-16 RX ORDER — INFLUENZA VIRUS VACCINE 15; 15; 15; 15 UG/.5ML; UG/.5ML; UG/.5ML; UG/.5ML
0.5 SUSPENSION INTRAMUSCULAR ONCE
Refills: 0 | Status: DISCONTINUED | OUTPATIENT
Start: 2023-10-16 | End: 2023-10-17

## 2023-10-16 NOTE — CONSULT NOTE ADULT - ATTENDING COMMENTS
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 Troponin 2700 then 2900, earlier markedly elevated troponin-i at [facility]. Has been afebrile for 48 hours, has no chest pain, no dyspnea, no palpitations, vital signs stable, exam without findings, echo at referring hospital with normal LV function, no regional wall motion abnormality. Plan cardiac MRI.    To contact call Cardiology Fellow or Attending as listed on [email or web address] password: [email or web address].

## 2023-10-16 NOTE — CONSULT NOTE ADULT - SUBJECTIVE AND OBJECTIVE BOX
Cardiology Consult Note   [Please check [email or web address] password: [email or web address]" for cardiology service schedule and contact information]    HPI:  23-year-old male presents as a transfer from [place].  Patient reports that he has been having body aches and fever up to 102 beginning 6 days ago.  She says she is 3 to 4 days ago started having nausea and vomiting and presented to an emergency department where he was given IV hydration and Zofran and subsequently discharged.  He was called back and advised that his troponin was elevated. patient admitted and troponin up trending, echo with preserved EF. transferred for cardiac MRI.   (15 Oct 2023 19:01)      PAST MEDICAL & SURGICAL HISTORY:  No pertinent past medical history      FAMILY HISTORY:    SOCIAL HISTORY:  unchanged    MEDICATIONS:    REVIEW OF SYSTEMS:  CV: chest pain (-), palpitation (-), orthopnea (-), PND (-), edema (-)  PULM: SOB (-), cough (-), wheezing (-), hemoptysis (-).   CONST: fever (-), chills (-) or fatigue (-)  GI: abdominal distension (-), abdominal pain (-) , nausea/vomiting (-), hematemesis, (-), melena (-), hematochezia (-)  : dysuria (-), frequency (-), hematuria (-).   NEURO: numbness (-), weakness (-), dizziness (-)  SKIN: itching (-), rash (-)  HEENT:  visual changes (-); vertigo or throat pain (-);  neck stiffness (-)     All other review of systems is negative unless indicated above.   -------------------------------------------------------------------------------------------  PHYSICAL EXAM:  T(C): 36.9 (10-16-23 @ 01:15), Max: 37 (10-15-23 @ 23:23)  HR: 79 (10-16-23 @ 01:15) (77 - 88)  BP: 121/74 (10-16-23 @ 01:15) (111/69 - 125/92)  RR: 17 (10-16-23 @ 01:15) (17 - 20)  SpO2: 99% (10-16-23 @ 01:15) (95% - 100%)  Wt(kg): --  I&O's Summary      GENERAL: NAD  HEAD: Atraumatic, Normocephalic.  EYES: EOMI, PERRLA, conjunctiva and sclera clear.  ENT: Moist mucous membranes.  NECK: Supple, No JVD.  CHEST/LUNG: Clear to auscultation bilaterally; No rales, rhonchi, wheezing, or rubs. Unlabored respirations.  HEART: Regular rate and rhythm; No murmurs, rubs, or gallops.  ABDOMEN: Bowel sounds present; Soft, Nontender, Nondistended.   EXTREMITIES:  2+ Peripheral Pulses, brisk capillary refill. No clubbing, cyanosis, or edema.  PSYCH: Normal affect.  SKIN: No rashes or lesions.    -------------------------------------------------------------------------------------------  LABS:                          13.8   6.08  )-----------( 258      ( 15 Oct 2023 18:21 )             41.1     10-15    140  |  103  |  9   ----------------------------<  101<H>  3.9   |  25  |  0.88    Ca    9.9      15 Oct 2023 18:21    TPro  8.1  /  Alb  4.6  /  TBili  0.4  /  DBili  x   /  AST  69<H>  /  ALT  35  /  AlkPhos  82  10-15    PT/INR - ( 15 Oct 2023 18:21 )   PT: 13.3 sec;   INR: 1.22 ratio         PTT - ( 15 Oct 2023 18:21 )  PTT:27.7 sec  CARDIAC MARKERS ( 16 Oct 2023 01:21 )  2974 ng/L / x     / x     / 218 U/L / x     / 10.9 ng/mL  CARDIAC MARKERS ( 15 Oct 2023 18:21 )  2716 ng/L / x     / x     / x     / x     / x                 Cardiology Consult Note   [Please check [email or web address] password: [email or web address]" for cardiology service schedule and contact information]    HPI:  23-year-old male presents as a transfer from [place].  Patient reports that he has been having body aches and fever up to 102 beginning 6 days ago.  She says she is 3 to 4 days ago started having nausea and vomiting and presented to an emergency department where he was given IV hydration and Zofran and subsequently discharged.  He was called back and advised that his troponin was elevated. patient admitted and troponin up trending, echo with preserved EF. transferred for cardiac MRI.   (15 Oct 2023 19:01)    patient states he had viral flu like symptoms beginning ~1 week ago  started with fatigue, weakness followed by fevers and chills  associated with cough, nausea, vomiting  went to ED on friday- admitted and treated with IVF and zofran, discharged to home next day  represented to ED on sunday with persistent symptoms- sent home after feeling better but troponin I resulted as >[id number] after leaving- patient called about result and asked to come back to ED  patient endorses MSK chest pain from coughing but no other chest discomfort symptoms  father has a hx of MI, no other family hx of cardiac disease    TTE from :  Summary:   1. Normal global left ventricular systolic function.   2. Left ventricular ejection fraction, by visual estimation, is 55 to 60%.   3. The left atrium is normal in size.   4. Structurally normal mitral valve, with normal leaflet excursion.   5. Trace mitral valve regurgitation.   6. Normal trileaflet aortic valve with normal opening.   7. The right atrium is normal in size.   8. Structurally normal tricuspid valve, with normal leaflet excursion.   9. Structurally normal pulmonic valve, with normal leaflet excursion.    PAST MEDICAL & SURGICAL HISTORY:  No pertinent past medical history      FAMILY HISTORY:    SOCIAL HISTORY:  unchanged    MEDICATIONS:    REVIEW OF SYSTEMS:  CV: chest pain (+), palpitation (-), orthopnea (-), PND (-), edema (-)  PULM: SOB (-), cough (-), wheezing (-), hemoptysis (-).   CONST: fever (-), chills (-) or fatigue (-)  GI: abdominal distension (-), abdominal pain (-) , nausea/vomiting (-), hematemesis, (-), melena (-), hematochezia (-)  : dysuria (-), frequency (-), hematuria (-).   NEURO: numbness (-), weakness (-), dizziness (-)  SKIN: itching (-), rash (-)  HEENT:  visual changes (-); vertigo or throat pain (-);  neck stiffness (-)     All other review of systems is negative unless indicated above.   -------------------------------------------------------------------------------------------  PHYSICAL EXAM:  T(C): 36.9 (10-16-23 @ 01:15), Max: 37 (10-15-23 @ 23:23)  HR: 79 (10-16-23 @ 01:15) (77 - 88)  BP: 121/74 (10-16-23 @ 01:15) (111/69 - 125/92)  RR: 17 (10-16-23 @ 01:15) (17 - 20)  SpO2: 99% (10-16-23 @ 01:15) (95% - 100%)  Wt(kg): --  I&O's Summary      GENERAL: NAD  HEAD: Atraumatic, Normocephalic.  EYES: EOMI, PERRLA, conjunctiva and sclera clear.  ENT: Moist mucous membranes.  NECK: Supple, No JVD.  CHEST/LUNG: Clear to auscultation bilaterally; No rales, rhonchi, wheezing, or rubs. Unlabored respirations.  HEART: Regular rate and rhythm; No murmurs, rubs, or gallops.  ABDOMEN: Bowel sounds present; Soft, Nontender, Nondistended.   EXTREMITIES:  2+ Peripheral Pulses, brisk capillary refill. No clubbing, cyanosis, or edema.  PSYCH: Normal affect.  SKIN: No rashes or lesions.    -------------------------------------------------------------------------------------------  LABS:                          13.8   6.08  )-----------( 258      ( 15 Oct 2023 18:21 )             41.1     10-15    140  |  103  |  9   ----------------------------<  101<H>  3.9   |  25  |  0.88    Ca    9.9      15 Oct 2023 18:21    TPro  8.1  /  Alb  4.6  /  TBili  0.4  /  DBili  x   /  AST  69<H>  /  ALT  35  /  AlkPhos  82  10-15    PT/INR - ( 15 Oct 2023 18:21 )   PT: 13.3 sec;   INR: 1.22 ratio         PTT - ( 15 Oct 2023 18:21 )  PTT:27.7 sec  CARDIAC MARKERS ( 16 Oct 2023 01:21 )  2974 ng/L / x     / x     / 218 U/L / x     / 10.9 ng/mL  CARDIAC MARKERS ( 15 Oct 2023 18:21 )  2716 ng/L / x     / x     / x     / x     / x

## 2023-10-16 NOTE — PROGRESS NOTE ADULT - SUBJECTIVE AND OBJECTIVE BOX
Patient is a 23y old  Male who presents with a chief complaint of The patient is a 23y Male was transferred to [facility] because of elevated troponin (16 Oct 2023 02:12)      SUBJECTIVE / OVERNIGHT EVENTS:    Events noted.  CONSTITUTIONAL: No fever,  or fatigue  RESPIRATORY: No cough, wheezing,  No shortness of breath  CARDIOVASCULAR: No chest pain, palpitations, dizziness, or leg swelling  GASTROINTESTINAL: No abdominal or epigastric pain.       MEDICATIONS  (STANDING):  influenza   Vaccine 0.5 milliLiter(s) IntraMuscular once    MEDICATIONS  (PRN):        CAPILLARY BLOOD GLUCOSE        I&O's Summary      T(C): 36.2 (10-16-23 @ 16:28), Max: 37 (10-15-23 @ 23:23)  HR: 77 (10-16-23 @ 16:28) (66 - 87)  BP: 122/73 (10-16-23 @ 16:28) (107/70 - 126/82)  RR: 18 (10-16-23 @ 16:28) (13 - 18)  SpO2: 96% (10-16-23 @ 16:28) (96% - 100%)    PHYSICAL EXAM:  GENERAL: NAD  NECK: Supple, No JVD  CHEST/LUNG: Clear to auscultation bilaterally; No wheezing.  HEART: Regular rate and rhythm; No murmurs, rubs, or gallops  ABDOMEN: Soft, Nontender, Nondistended; Bowel sounds present  EXTREMITIES:   No edema  NEUROLOGY: AAO X 3      LABS:                        13.1   6.52  )-----------( 248      ( 16 Oct 2023 06:34 )             39.3     10-16    140  |  103  |  9   ----------------------------<  88  3.9   |  22  |  1.07    Ca    9.5      16 Oct 2023 06:34    TPro  8.1  /  Alb  4.6  /  TBili  0.4  /  DBili  x   /  AST  69<H>  /  ALT  35  /  AlkPhos  82  10-15    PT/INR - ( 15 Oct 2023 18:21 )   PT: 13.3 sec;   INR: 1.22 ratio         PTT - ( 15 Oct 2023 18:21 )  PTT:27.7 sec  CARDIAC MARKERS ( 16 Oct 2023 08:46 )  x     / x     / 153 U/L / x     / 6.7 ng/mL  CARDIAC MARKERS ( 16 Oct 2023 01:21 )  x     / x     / 218 U/L / x     / 10.9 ng/mL      Urinalysis Basic - ( 16 Oct 2023 06:34 )    Color: x / Appearance: x / SG: x / pH: x  Gluc: 88 mg/dL / Ketone: x  / Bili: x / Urobili: x   Blood: x / Protein: x / Nitrite: x   Leuk Esterase: x / RBC: x / WBC x   Sq Epi: x / Non Sq Epi: x / Bacteria: x      CAPILLARY BLOOD GLUCOSE            RADIOLOGY & ADDITIONAL TESTS:    Imaging Personally Reviewed:    Consultant(s) Notes Reviewed:      Care Discussed with Consultants/Other Providers:    Arturo Wu MD, CMD, FACP    [address] [place] [address]  Office Tel: [phone]  Cell: [phone]

## 2023-10-16 NOTE — CONSULT NOTE ADULT - ASSESSMENT
The patient is a 23M with no significant pmhx transferred to [facility] for cMRI w/ concern for acute myocarditis, in the setting of recent viral illness    #Concern for acute myocarditis  Patient presented to OSH with viral symptoms- fevers, chills, fatigue, body aches  Discharged to home, but called to return to ED given elevated trops  Presented to [facility], transferred to [facility] for cMRI  [ ] Inflammatory markers: ESR 20,   [ ] Cardiac biomarkers: trop 2716 -> 2974, , CKMB 10.9, BNP 1022  [ ] Viral testing: COVID, RPP negative  [ ] No eosinophilia noted to peripheral CBC  [ ] EKG abnormal with no specific ischemic changes  - please repeat TTE, order cMRI  - patient may need ischemic eval to formally exclude CAD- may be good candidate for CCTA  - no urgent indication for EMB, but will consider pending cMRI results    Nayely Hargrove MD  PGY-4, Cardiology Fellow    Please check [email or web address] password: [email or web address]" for cardiology service schedule and contact information.   *Please note that all recommendations are incomplete until attending attestation*

## 2023-10-16 NOTE — PROGRESS NOTE ADULT - ASSESSMENT
The patient is a 23y Male was transferred to NS because of elevated troponin.    Elevated troponin:    Cardio eval appreciated  Cardiac MRI

## 2023-10-17 ENCOUNTER — TRANSCRIPTION ENCOUNTER (OUTPATIENT)
Age: 23
End: 2023-10-17

## 2023-10-17 VITALS
DIASTOLIC BLOOD PRESSURE: 67 MMHG | RESPIRATION RATE: 18 BRPM | SYSTOLIC BLOOD PRESSURE: 109 MMHG | OXYGEN SATURATION: 99 % | HEART RATE: 65 BPM | TEMPERATURE: 98 F

## 2023-10-17 LAB
ANION GAP SERPL CALC-SCNC: 11 MMOL/L — SIGNIFICANT CHANGE UP (ref 5–17)
ANION GAP SERPL CALC-SCNC: 11 MMOL/L — SIGNIFICANT CHANGE UP (ref 5–17)
BUN SERPL-MCNC: 11 MG/DL — SIGNIFICANT CHANGE UP (ref 7–23)
BUN SERPL-MCNC: 11 MG/DL — SIGNIFICANT CHANGE UP (ref 7–23)
CALCIUM SERPL-MCNC: 9.6 MG/DL — SIGNIFICANT CHANGE UP (ref 8.4–10.5)
CALCIUM SERPL-MCNC: 9.6 MG/DL — SIGNIFICANT CHANGE UP (ref 8.4–10.5)
CHLORIDE SERPL-SCNC: 103 MMOL/L — SIGNIFICANT CHANGE UP (ref 96–108)
CHLORIDE SERPL-SCNC: 103 MMOL/L — SIGNIFICANT CHANGE UP (ref 96–108)
CO2 SERPL-SCNC: 24 MMOL/L — SIGNIFICANT CHANGE UP (ref 22–31)
CO2 SERPL-SCNC: 24 MMOL/L — SIGNIFICANT CHANGE UP (ref 22–31)
CREAT SERPL-MCNC: 0.95 MG/DL — SIGNIFICANT CHANGE UP (ref 0.5–1.3)
CREAT SERPL-MCNC: 0.95 MG/DL — SIGNIFICANT CHANGE UP (ref 0.5–1.3)
EGFR: 114 ML/MIN/1.73M2 — SIGNIFICANT CHANGE UP
EGFR: 114 ML/MIN/1.73M2 — SIGNIFICANT CHANGE UP
GLUCOSE SERPL-MCNC: 92 MG/DL — SIGNIFICANT CHANGE UP (ref 70–99)
GLUCOSE SERPL-MCNC: 92 MG/DL — SIGNIFICANT CHANGE UP (ref 70–99)
POTASSIUM SERPL-MCNC: 4.2 MMOL/L — SIGNIFICANT CHANGE UP (ref 3.5–5.3)
POTASSIUM SERPL-MCNC: 4.2 MMOL/L — SIGNIFICANT CHANGE UP (ref 3.5–5.3)
POTASSIUM SERPL-SCNC: 4.2 MMOL/L — SIGNIFICANT CHANGE UP (ref 3.5–5.3)
POTASSIUM SERPL-SCNC: 4.2 MMOL/L — SIGNIFICANT CHANGE UP (ref 3.5–5.3)
SODIUM SERPL-SCNC: 138 MMOL/L — SIGNIFICANT CHANGE UP (ref 135–145)
SODIUM SERPL-SCNC: 138 MMOL/L — SIGNIFICANT CHANGE UP (ref 135–145)
TROPONIN T, HIGH SENSITIVITY RESULT: 546 NG/L — HIGH (ref 0–51)
TROPONIN T, HIGH SENSITIVITY RESULT: 546 NG/L — HIGH (ref 0–51)

## 2023-10-17 PROCEDURE — 75561 CARDIAC MRI FOR MORPH W/DYE: CPT | Mod: 26

## 2023-10-17 PROCEDURE — 85027 COMPLETE CBC AUTOMATED: CPT

## 2023-10-17 PROCEDURE — 36415 COLL VENOUS BLD VENIPUNCTURE: CPT

## 2023-10-17 PROCEDURE — A9585: CPT

## 2023-10-17 PROCEDURE — 82330 ASSAY OF CALCIUM: CPT

## 2023-10-17 PROCEDURE — 0225U NFCT DS DNA&RNA 21 SARSCOV2: CPT

## 2023-10-17 PROCEDURE — 84484 ASSAY OF TROPONIN QUANT: CPT

## 2023-10-17 PROCEDURE — 75561 CARDIAC MRI FOR MORPH W/DYE: CPT

## 2023-10-17 PROCEDURE — 82553 CREATINE MB FRACTION: CPT

## 2023-10-17 PROCEDURE — 85014 HEMATOCRIT: CPT

## 2023-10-17 PROCEDURE — 83605 ASSAY OF LACTIC ACID: CPT

## 2023-10-17 PROCEDURE — 86140 C-REACTIVE PROTEIN: CPT

## 2023-10-17 PROCEDURE — 85025 COMPLETE CBC W/AUTO DIFF WBC: CPT

## 2023-10-17 PROCEDURE — 86901 BLOOD TYPING SEROLOGIC RH(D): CPT

## 2023-10-17 PROCEDURE — 99285 EMERGENCY DEPT VISIT HI MDM: CPT

## 2023-10-17 PROCEDURE — 71045 X-RAY EXAM CHEST 1 VIEW: CPT

## 2023-10-17 PROCEDURE — 82803 BLOOD GASES ANY COMBINATION: CPT

## 2023-10-17 PROCEDURE — 99233 SBSQ HOSP IP/OBS HIGH 50: CPT | Mod: GC

## 2023-10-17 PROCEDURE — 85730 THROMBOPLASTIN TIME PARTIAL: CPT

## 2023-10-17 PROCEDURE — 86900 BLOOD TYPING SEROLOGIC ABO: CPT

## 2023-10-17 PROCEDURE — 85652 RBC SED RATE AUTOMATED: CPT

## 2023-10-17 PROCEDURE — 82947 ASSAY GLUCOSE BLOOD QUANT: CPT

## 2023-10-17 PROCEDURE — 85018 HEMOGLOBIN: CPT

## 2023-10-17 PROCEDURE — 83880 ASSAY OF NATRIURETIC PEPTIDE: CPT

## 2023-10-17 PROCEDURE — 82435 ASSAY OF BLOOD CHLORIDE: CPT

## 2023-10-17 PROCEDURE — 82550 ASSAY OF CK (CPK): CPT

## 2023-10-17 PROCEDURE — 86850 RBC ANTIBODY SCREEN: CPT

## 2023-10-17 PROCEDURE — 80048 BASIC METABOLIC PNL TOTAL CA: CPT

## 2023-10-17 PROCEDURE — 85610 PROTHROMBIN TIME: CPT

## 2023-10-17 PROCEDURE — 84132 ASSAY OF SERUM POTASSIUM: CPT

## 2023-10-17 PROCEDURE — 80053 COMPREHEN METABOLIC PANEL: CPT

## 2023-10-17 PROCEDURE — 84295 ASSAY OF SERUM SODIUM: CPT

## 2023-10-17 RX ORDER — CHLORHEXIDINE GLUCONATE 213 G/1000ML
1 SOLUTION TOPICAL
Refills: 0 | Status: DISCONTINUED | OUTPATIENT
Start: 2023-10-17 | End: 2023-10-17

## 2023-10-17 NOTE — DISCHARGE NOTE PROVIDER - CARE PROVIDER_API CALL
[facility]offey [facility]  Internal Medicine  [address], [address]  [place] [address]  Phone: [phone]  Fax: [phone]  Follow Up Time:     Andrew Helms  Cardiovascular Disease  [address]  [place] [address]  Phone: [phone]  Fax: [phone]  Follow Up Time:

## 2023-10-17 NOTE — PROGRESS NOTE ADULT - REASON FOR ADMISSION
The patient is a 23y Male was transferred to NS because of elevated troponin
The patient is a 23y Male was transferred to NS because of elevated troponin

## 2023-10-17 NOTE — PROGRESS NOTE ADULT - ATTENDING COMMENTS
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 Troponin 2700 then 2900, earlier markedly elevated troponin-i at [facility]. Has been afebrile for 48 hours, has no chest pain, no dyspnea, no palpitations, vital signs stable, exam without findings, echo at referring hospital with normal LV function, no regional wall motion abnormality. Has remained asymptomatic, afebrile, troponin now markedly declined. Cardiac MRI today and possible subsequent discharge.    To contact call Cardiology Fellow or Attending as listed on [email or web address] password: [email or web address].

## 2023-10-17 NOTE — PROGRESS NOTE ADULT - SUBJECTIVE AND OBJECTIVE BOX
Cardiology Progress Note  ------------------------------------------------------------------------------------------    SUBJECTIVE/EVENTS::  - Tele: No events  - NAEO    -------------------------------------------------------------------------------------------  ROS:  CV: chest pain (-), palpitation (-), orthopnea (-), PND (-), edema (-)  PULM: SOB (-), cough (-), wheezing (-), hemoptysis (-).   CONST: fever (-), chills (-) or fatigue (-)  GI: abdominal distension (-), abdominal pain (-) , nausea/vomiting (-), hematemesis, (-), melena (-), hematochezia (-)  : dysuria (-), frequency (-), hematuria (-).   NEURO: numbness (-), weakness (-), dizziness (-)  MSK: myalgia (-), joint pain (-)   SKIN: itching (-), rash (-)  HEENT:  visual changes (-); vertigo or throat pain (-);  neck stiffness (-)   Psych: change in mood (-), anxiety (-), depression (-)     All other review of systems is negative unless indicated above.   -------------------------------------------------------------------------------------------  VS:  T(F): 97.5 (10-17), Max: 98.4 (10-17)  HR: 79 (10-17) (66 - 79)  BP: 104/67 (10-17) (104/67 - 128/85)  RR: 18 (10-17)  SpO2: 98% (10-17)  I&O's Summary    ------------------------------------------------------------------------------------------  PHYSICAL EXAM:  GEN: NAD, sitting in bed  HEENT: NCAT, EOMI  CV: RRR, Ns1/s2, no m/r/g, JVP not elevated  RESP: CTA B/l, no w/r/r  ABD: soft, nt, nd  Ext: warm, 2+ pulses, no edema  Neuro: AAOx3, no focal deficits  Psych: Calm, cooperative    -------------------------------------------------------------------------------------------  LABS:                          13.1   6.52  )-----------( 248      ( 16 Oct 2023 06:34 )             39.3     10-16    140  |  103  |  9   ----------------------------<  88  3.9   |  22  |  1.07    Ca    9.5      16 Oct 2023 06:34    TPro  8.1  /  Alb  4.6  /  TBili  0.4  /  DBili  x   /  AST  69<H>  /  ALT  35  /  AlkPhos  82  10-15    PT/INR - ( 15 Oct 2023 18:21 )   PT: 13.3 sec;   INR: 1.22 ratio         PTT - ( 15 Oct 2023 18:21 )  PTT:27.7 sec  CARDIAC MARKERS ( 16 Oct 2023 16:58 )  1323 ng/L / x     / x     / x     / x     / x      CARDIAC MARKERS ( 16 Oct 2023 08:46 )  2203 ng/L / x     / x     / 153 U/L / x     / 6.7 ng/mL  CARDIAC MARKERS ( 16 Oct 2023 01:21 )  2974 ng/L / x     / x     / 218 U/L / x     / 10.9 ng/mL  CARDIAC MARKERS ( 15 Oct 2023 18:21 )  2716 ng/L / x     / x     / x     / x     / x                -------------------------------------------------------------------------------------------  Meds:  influenza   Vaccine 0.5 milliLiter(s) IntraMuscular once    -------------------------------------------------------------------------------------------  Cardiovascular Diagnostic Testing:  TTE from [facility]:  TTE from :  Summary:   1. Normal global left ventricular systolic function.   2. Left ventricular ejection fraction, by visual estimation, is 55 to 60%.   3. The left atrium is normal in size.   4. Structurally normal mitral valve, with normal leaflet excursion.   5. Trace mitral valve regurgitation.   6. Normal trileaflet aortic valve with normal opening.   7. The right atrium is normal in size.   8. Structurally normal tricuspid valve, with normal leaflet excursion.   9. Structurally normal pulmonic valve, with normal leaflet excursion.      -------------------------------------------------------------------------------------------  Assessment and Plan:   22 yo M w/ no pmhx who was transferred from [facility] after he was found to have elevated troponin iso viral like sx c/f viral myocarditis. On transfer pt asx from cardiac perspective, EKG non-ischemic w/ trop peak at 2900, elevated ESR 20, , viral panel neg. TTE w/ normal LV fx.    1. Myocarditis - pt w/ 1w of viral sx, including fevers and chills found to have elevated trop peak 2900 w/ non-ischemic EKG and pleuritic chest wall pain iso coughing, but no substernal CP. TTE w/ normal LV EF and LV systolic fx. Awaiting CMR    Recommendations:  - f/up Cardiac MRI    *** Recommendations are preliminary until cosigned by the attending.    Matheus Del Rosario MD  Cardiology Fellow    For all New Consults and Questions:  [email or web address]   Login: [email or web address]   Cardiology Progress Note  ------------------------------------------------------------------------------------------    SUBJECTIVE/EVENTS::  - Tele: No events  - NAEO    -------------------------------------------------------------------------------------------  ROS:  CV: chest pain (-), palpitation (-), orthopnea (-), PND (-), edema (-)  PULM: SOB (-), cough (-), wheezing (-), hemoptysis (-).   CONST: fever (-), chills (-) or fatigue (-)  GI: abdominal distension (-), abdominal pain (-) , nausea/vomiting (-), hematemesis, (-), melena (-), hematochezia (-)  : dysuria (-), frequency (-), hematuria (-).   NEURO: numbness (-), weakness (-), dizziness (-)  MSK: myalgia (-), joint pain (-)   SKIN: itching (-), rash (-)  HEENT:  visual changes (-); vertigo or throat pain (-);  neck stiffness (-)   Psych: change in mood (-), anxiety (-), depression (-)     All other review of systems is negative unless indicated above.   -------------------------------------------------------------------------------------------  VS:  T(F): 97.5 (10-17), Max: 98.4 (10-17)  HR: 79 (10-17) (66 - 79)  BP: 104/67 (10-17) (104/67 - 128/85)  RR: 18 (10-17)  SpO2: 98% (10-17)  I&O's Summary    ------------------------------------------------------------------------------------------  PHYSICAL EXAM:  GEN: NAD, sitting in bed  HEENT: NCAT, EOMI  CV: RRR, Ns1/s2, no m/r/g, JVP not elevated  RESP: CTA B/l, no w/r/r  ABD: soft, nt, nd  Ext: warm, 2+ pulses, no edema  Neuro: AAOx3, no focal deficits  Psych: Calm, cooperative    -------------------------------------------------------------------------------------------  LABS:                          13.1   6.52  )-----------( 248      ( 16 Oct 2023 06:34 )             39.3     10-16    140  |  103  |  9   ----------------------------<  88  3.9   |  22  |  1.07    Ca    9.5      16 Oct 2023 06:34    TPro  8.1  /  Alb  4.6  /  TBili  0.4  /  DBili  x   /  AST  69<H>  /  ALT  35  /  AlkPhos  82  10-15    PT/INR - ( 15 Oct 2023 18:21 )   PT: 13.3 sec;   INR: 1.22 ratio         PTT - ( 15 Oct 2023 18:21 )  PTT:27.7 sec  CARDIAC MARKERS ( 16 Oct 2023 16:58 )  1323 ng/L / x     / x     / x     / x     / x      CARDIAC MARKERS ( 16 Oct 2023 08:46 )  2203 ng/L / x     / x     / 153 U/L / x     / 6.7 ng/mL  CARDIAC MARKERS ( 16 Oct 2023 01:21 )  2974 ng/L / x     / x     / 218 U/L / x     / 10.9 ng/mL  CARDIAC MARKERS ( 15 Oct 2023 18:21 )  2716 ng/L / x     / x     / x     / x     / x                -------------------------------------------------------------------------------------------  Meds:  influenza   Vaccine 0.5 milliLiter(s) IntraMuscular once    -------------------------------------------------------------------------------------------  Cardiovascular Diagnostic Testing:  TTE from [facility]:  TTE from :  Summary:   1. Normal global left ventricular systolic function.   2. Left ventricular ejection fraction, by visual estimation, is 55 to 60%.   3. The left atrium is normal in size.   4. Structurally normal mitral valve, with normal leaflet excursion.   5. Trace mitral valve regurgitation.   6. Normal trileaflet aortic valve with normal opening.   7. The right atrium is normal in size.   8. Structurally normal tricuspid valve, with normal leaflet excursion.   9. Structurally normal pulmonic valve, with normal leaflet excursion.      -------------------------------------------------------------------------------------------  Assessment and Plan:   22 yo M w/ no pmhx who was transferred from [facility] after he was found to have elevated troponin iso viral like sx c/f viral myocarditis. On transfer pt asx from cardiac perspective, EKG non-ischemic w/ trop peak at 2900, elevated ESR 20, , viral panel neg. TTE w/ normal LV fx.    1. Myocarditis - pt w/ 1w of viral sx, including fevers and chills found to have elevated trop peak 2900 w/ non-ischemic EKG and pleuritic chest wall pain iso coughing, but no substernal CP. TTE w/ normal LV EF and LV systolic fx. Awaiting CMR    Recommendations:  - f/up Cardiac MRI    Matheus Del Rosario MD  Cardiology Fellow    For all New Consults and Questions:  [email or web address]   Login: [email or web address]

## 2023-10-17 NOTE — DISCHARGE NOTE PROVIDER - NSDCFUADDAPPT_GEN_ALL_CORE_FT
APPTS ARE READY TO BE MADE: [X] YES    Best Family or Patient Contact (if needed):    Additional Information about above appointments (if needed):    1: Follow up with PCP Dr. Alexx linn   2: Follow up with Cardiology within 2 weeks outpatient     Other comments or requests:    APPTS ARE READY TO BE MADE: [X] YES    Best Family or Patient Contact (if needed):    Additional Information about above appointments (if needed):    1: Follow up with PCP Dr. Alexx linn   2: Follow up with Cardiology within 2 weeks outpatient     Other comments or requests:   Outreached on (10/18, 10/19 and 10/20) which have been unsuccessful. Will await call back from patient/caregiver to coordinate follow up care. APPTS ARE READY TO BE MADE: [X] YES    Best Family or Patient Contact (if needed):    Additional Information about above appointments (if needed):    1: Follow up with PCP Dr. Coffey outpt   2: Follow up with Cardiology within 2 weeks outpatient     Other comments or requests:   Patient was previously scheduled for an appointment on  10/24 with Dr. Coffey  Patient was previously scheduled for an appointment on  10/25with Cardiology

## 2023-10-17 NOTE — DISCHARGE NOTE PROVIDER - HOSPITAL COURSE
Cardiac MRI- Normal left ventricular size and function. LVEF: 58%. Multisegment subepicardial and mid myocardial late gadolinium enhancement throughout   the base to apex, as detailed above, in a nonischemic distribution, with associated myocardial edema. Constellation of findings most consistent with acute myocarditis. No significant valvular abnormalities. HPI:  23-year-old male presents as a transfer from [place].  Patient reports that he has been having body aches and fever up to 102 beginning 6 days ago.  She says she is 3 to 4 days ago started having nausea and vomiting and presented to an emergency department where he was given IV hydration and Zofran and subsequently discharged.  He was called back and advised that his troponin was elevated. patient admitted and troponin up trending, echo with preserved EF. transferred for cardiac MRI.     Hospital Course:  Pt presented as transfer from  with body ache, fever, and elevated troponins. Troponin 2716 downtrending to 546. Pt now has been afebrile for 48 hours, has no chest pain, no dyspnea, no palpitations, vital signs stable, exam without findings, echo at referring hospital with normal LV function, no regional wall motion abnormality. Has remained asymptomatic, afebrile, troponin now markedly declined. Pt went for cardiac MRI on 10/17 which revealed "Normal left ventricular size and function. LVEF: 58%. Multisegment subepicardial and mid myocardial late gadolinium enhancement throughout the base to apex, as detailed above, in a nonischemic distribution, with associated myocardial edema. Constellation of findings most consistent with acute myocarditis. No significant valvular abnormalities". Pt to follow up as outpatient with PCP and cardiology.     Important Medication Changes and Reason:  -N/A     Active or Pending Issues Requiring Follow-up:  -Follow up with PCP  -Follow up with Cardiology within 1-2 weeks given myocarditis     Advanced Directives:   [X] Full code  [ ] DNR  [ ] Hospice    Discharge Diagnoses:  -Myocarditis       Discharge/Dispo/Med rec discussed with attending Dr. Wu. Patient medically cleared for discharge Home with outpatient follow up with PCP and cardiology

## 2023-10-17 NOTE — DISCHARGE NOTE PROVIDER - CARE PROVIDERS DIRECT ADDRESSES
,DirectAddress_Unknown,[email or web address], [email or web address]

## 2023-10-17 NOTE — DISCHARGE NOTE NURSING/CASE MANAGEMENT/SOCIAL WORK - PATIENT PORTAL LINK FT
You can access the FollowMyHealth Patient Portal offered by [facility] by registering at the following website: [email or web address]. By joining [email or web address]’s FollowMyHealth portal, you will also be able to view your health information using other applications (apps) compatible with our system.

## 2023-10-17 NOTE — DISCHARGE NOTE PROVIDER - NSFOLLOWUPCLINICS_GEN_ALL_ED_FT
Cardiology at [facility]  Cardiology  [address] [place] [address]  Phone: [phone]  Fax:   Follow Up Time: 2 weeks

## 2023-10-17 NOTE — DISCHARGE NOTE NURSING/CASE MANAGEMENT/SOCIAL WORK - NSDCFUADDAPPT_GEN_ALL_CORE_FT
APPTS ARE READY TO BE MADE: [X] YES    Best Family or Patient Contact (if needed):    Additional Information about above appointments (if needed):    1: Follow up with PCP Dr. Alexx linn   2: Follow up with Cardiology within 2 weeks outpatient     Other comments or requests:

## 2023-10-17 NOTE — DISCHARGE NOTE PROVIDER - NSDCCPCAREPLAN_GEN_ALL_CORE_FT
PRINCIPAL DISCHARGE DIAGNOSIS  Diagnosis: Myocarditis  Assessment and Plan of Treatment: if symptoms worsen, or you experience worsening chest pain- contact provider  Follow up with cardiology within 2 weeks outpatient

## 2023-10-18 ENCOUNTER — NON-APPOINTMENT (OUTPATIENT)
Age: 23
End: 2023-10-18

## 2023-10-18 PROBLEM — Z00.00 ENCOUNTER FOR PREVENTIVE HEALTH EXAMINATION: Status: ACTIVE | Noted: 2023-10-18

## 2023-10-19 NOTE — CHART NOTE - NSCHARTNOTEFT_GEN_A_CORE
Left another message for patient in regards to follow up care with callback information.
Left (1) message(s) for patient in regards to follow up care with callback information.

## 2023-10-20 LAB
CULTURE RESULTS: SIGNIFICANT CHANGE UP
SPECIMEN SOURCE: SIGNIFICANT CHANGE UP

## 2023-10-23 DIAGNOSIS — R79.89 OTHER SPECIFIED ABNORMAL FINDINGS OF BLOOD CHEMISTRY: ICD-10-CM

## 2023-10-23 DIAGNOSIS — I40.9 ACUTE MYOCARDITIS, UNSPECIFIED: ICD-10-CM

## 2023-10-23 DIAGNOSIS — B34.9 VIRAL INFECTION, UNSPECIFIED: ICD-10-CM

## 2023-10-25 ENCOUNTER — TRANSCRIPTION ENCOUNTER (OUTPATIENT)
Age: 23
End: 2023-10-25

## 2023-11-01 PROBLEM — Z78.9 OTHER SPECIFIED HEALTH STATUS: Chronic | Status: ACTIVE | Noted: 2023-10-14

## 2024-05-27 NOTE — ED PROVIDER NOTE - PATIENT PORTAL LINK FT
Monitor Summary  Rhythm: Normal Sinus Rhythm - ST  Rate:   Ectopy: NA  .19 / .07 / .37          You can access the FollowMyHealth Patient Portal offered by [facility] by registering at the following website: [email or web address]. By joining [employer]’s FollowMyHealth portal, you will also be able to view your health information using other applications (apps) compatible with our system.

## 2024-06-25 ENCOUNTER — EMERGENCY (EMERGENCY)
Facility: HOSPITAL | Age: 24
LOS: 1 days | Discharge: HOME | End: 2024-06-25
Attending: EMERGENCY MEDICINE | Admitting: EMERGENCY MEDICINE
Payer: OTHER MISCELLANEOUS

## 2024-06-25 VITALS
OXYGEN SATURATION: 100 % | DIASTOLIC BLOOD PRESSURE: 77 MMHG | TEMPERATURE: 99 F | RESPIRATION RATE: 18 BRPM | HEART RATE: 70 BPM | WEIGHT: 220.02 LBS | SYSTOLIC BLOOD PRESSURE: 140 MMHG

## 2024-06-25 PROCEDURE — 99284 EMERGENCY DEPT VISIT MOD MDM: CPT

## 2024-06-25 RX ORDER — IBUPROFEN 200 MG
600 TABLET ORAL ONCE
Refills: 0 | Status: COMPLETED | OUTPATIENT
Start: 2024-06-25 | End: 2024-06-25

## 2024-06-25 RX ORDER — ACETAMINOPHEN 500 MG
650 TABLET ORAL ONCE
Refills: 0 | Status: COMPLETED | OUTPATIENT
Start: 2024-06-25 | End: 2024-06-25

## 2024-06-25 RX ORDER — LIDOCAINE 4 G/100G
1 CREAM TOPICAL ONCE
Refills: 0 | Status: COMPLETED | OUTPATIENT
Start: 2024-06-25 | End: 2024-06-25

## 2024-06-25 RX ADMIN — LIDOCAINE 1 PATCH: 4 CREAM TOPICAL at 18:32

## 2024-06-25 RX ADMIN — Medication 650 MILLIGRAM(S): at 18:30

## 2024-06-25 RX ADMIN — Medication 600 MILLIGRAM(S): at 18:30
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
## 2024-06-25 NOTE — ED PROVIDER NOTE - PATIENT PORTAL LINK FT
You can access the FollowMyHealth Patient Portal offered by [facility] by registering at the following website: [email or web address]. By joining [email or web address]’s FollowMyHealth portal, you will also be able to view your health information using other applications (apps) compatible with our system.
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
## 2024-06-25 NOTE — ED ADULT TRIAGE NOTE - ARRIVAL FROM
Work Tissue Cultured Epidermal Autograft Text: The defect edges were debeveled with a #15 scalpel blade.  Given the location of the defect, shape of the defect and the proximity to free margins a tissue cultured epidermal autograft was deemed most appropriate.  The graft was then trimmed to fit the size of the defect.  The graft was then placed in the primary defect and oriented appropriately.
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
## 2024-06-27 PROBLEM — Z78.9 OTHER SPECIFIED HEALTH STATUS: Chronic | Status: ACTIVE | Noted: 2023-10-15

## 2025-06-20 ENCOUNTER — EMERGENCY (EMERGENCY)
Facility: HOSPITAL | Age: 25
LOS: 1 days | End: 2025-06-20
Admitting: EMERGENCY MEDICINE
Payer: OTHER MISCELLANEOUS

## 2025-06-20 VITALS
OXYGEN SATURATION: 100 % | SYSTOLIC BLOOD PRESSURE: 150 MMHG | HEIGHT: 73 IN | TEMPERATURE: 98 F | HEART RATE: 78 BPM | WEIGHT: 225.09 LBS | DIASTOLIC BLOOD PRESSURE: 92 MMHG | RESPIRATION RATE: 18 BRPM

## 2025-06-20 PROCEDURE — 99053 MED SERV 10PM-8AM 24 HR FAC: CPT

## 2025-06-20 PROCEDURE — 99284 EMERGENCY DEPT VISIT MOD MDM: CPT

## 2025-06-20 NOTE — ED ADULT TRIAGE NOTE - CHIEF COMPLAINT QUOTE
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 Pt denies head injury, a.c use, l.o.c, CP, SOB, N/V/D. Fever and Chills. Hx HLD  pts right knee swollen and pt unable to bear weight on right leg. positive pulse motor sensory and limited range of motion.

## 2025-06-21 PROCEDURE — 73562 X-RAY EXAM OF KNEE 3: CPT | Mod: 26,RT

## 2025-06-21 PROCEDURE — 73030 X-RAY EXAM OF SHOULDER: CPT | Mod: 26,RT

## 2025-06-21 PROCEDURE — 73080 X-RAY EXAM OF ELBOW: CPT | Mod: 26,RT

## 2025-06-21 RX ORDER — KETOROLAC TROMETHAMINE 30 MG/ML
30 INJECTION, SOLUTION INTRAMUSCULAR; INTRAVENOUS ONCE
Refills: 0 | Status: DISCONTINUED | OUTPATIENT
Start: 2025-06-21 | End: 2025-06-21

## 2025-06-21 RX ADMIN — KETOROLAC TROMETHAMINE 30 MILLIGRAM(S): 30 INJECTION, SOLUTION INTRAMUSCULAR; INTRAVENOUS at 00:31

## 2025-06-21 NOTE — ED PROVIDER NOTE - ADDITIONAL NOTES AND INSTRUCTIONS:
Please excuse the absence of Mr. Watkins. He was evaluated in the emergency room. He may return to work on 6/28/2025.

## 2025-06-21 NOTE — ED PROVIDER NOTE - CLINICAL SUMMARY MEDICAL DECISION MAKING FREE TEXT BOX
24-year-old male, past medical history of hyperlipidemia presented to the ED with a complaint of right knee/elbow/shoulder pain after sustaining mechanical fall. On presentation patient well-appearing, vital stable, on exam swelling in the superficial abrasion to knee, abrasion to elbow and shoulder.  Plan for x-rays to rule out possible fractures.  Will provide analgesics.

## 2025-06-21 NOTE — ED PROVIDER NOTE - PATIENT PORTAL LINK FT
You can access the FollowMyHealth Patient Portal offered by [facility] by registering at the following website: [email or web address]. By joining [employer]’s FollowMyHealth portal, you will also be able to view your health information using other applications (apps) compatible with our system.

## 2025-06-21 NOTE — ED PROVIDER NOTE - OBJECTIVE STATEMENT
24-year-old male, past medical history of hyperlipidemia presented to the ED with a complaint of right knee/elbow/shoulder pain after sustaining mechanical fall.  He reports while running he tripped losing balance, resulting him falling hitting those respective body parts.  There was no head trauma or loss of consciousness.  He has been abated to bear weight on the right lower extremity.

## 2025-06-21 NOTE — ED ADULT NURSE NOTE - OBJECTIVE STATEMENT
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 Patient alert and oriented x3 . Denies LOC or hitting head. Right knee swollen and painful. Unable to ambulate due to pain. Awaiting for X-ray. Medicated as ordered.